# Patient Record
Sex: MALE | Race: WHITE | URBAN - NONMETROPOLITAN AREA
[De-identification: names, ages, dates, MRNs, and addresses within clinical notes are randomized per-mention and may not be internally consistent; named-entity substitution may affect disease eponyms.]

---

## 2019-09-16 ENCOUNTER — OFFICE VISIT (OUTPATIENT)
Dept: FAMILY MEDICINE | Facility: OTHER | Age: 39
End: 2019-09-16
Attending: CHIROPRACTOR

## 2019-09-16 DIAGNOSIS — Z02.89 ENCOUNTER FOR EXAMINATION REQUIRED BY DEPARTMENT OF TRANSPORTATION (DOT): Primary | ICD-10-CM

## 2019-09-16 LAB
ALBUMIN UR-MCNC: NEGATIVE MG/DL
APPEARANCE UR: CLEAR
BACTERIA #/AREA URNS HPF: ABNORMAL /HPF
BILIRUB UR QL STRIP: ABNORMAL
COLOR UR AUTO: YELLOW
GLUCOSE UR STRIP-MCNC: NEGATIVE MG/DL
HGB UR QL STRIP: NEGATIVE
KETONES UR STRIP-MCNC: NEGATIVE MG/DL
LEUKOCYTE ESTERASE UR QL STRIP: NEGATIVE
MUCOUS THREADS #/AREA URNS LPF: PRESENT /LPF
NITRATE UR QL: NEGATIVE
PH UR STRIP: 6.5 PH (ref 5–9)
RBC #/AREA URNS AUTO: ABNORMAL /HPF
SOURCE: ABNORMAL
SP GR UR STRIP: 1.02 (ref 1–1.03)
UROBILINOGEN UR STRIP-ACNC: 0.2 EU/DL (ref 0.2–1)
WBC #/AREA URNS AUTO: ABNORMAL /HPF

## 2019-09-16 PROCEDURE — 81001 URINALYSIS AUTO W/SCOPE: CPT | Mod: ZL | Performed by: CHIROPRACTOR

## 2019-09-16 PROCEDURE — 99499 UNLISTED E&M SERVICE: CPT | Performed by: CHIROPRACTOR

## 2019-09-16 NOTE — PROGRESS NOTES
[employer] ([employer]) physical performed.  See scanned documents dated today, 9/16/19.     Patient is authorized for 2 years under [employer] DOT Regulations.

## 2019-09-16 NOTE — NURSING NOTE
Patient presenting for a DOT physical. Urine analysis initiated per verbal orderthat was read back and verified. No LMP for male patient.  Medication Reconciliation: complete. See scanned in form for vitals.    Morena Anderson LPN  9/16/2019 4:15 PM

## 2019-09-17 VITALS — HEIGHT: 71 IN
